# Patient Record
Sex: FEMALE | Employment: FULL TIME | URBAN - METROPOLITAN AREA
[De-identification: names, ages, dates, MRNs, and addresses within clinical notes are randomized per-mention and may not be internally consistent; named-entity substitution may affect disease eponyms.]

---

## 2017-03-02 ENCOUNTER — DOCUMENTATION ONLY (OUTPATIENT)
Dept: NEUROLOGY | Age: 52
End: 2017-03-02

## 2017-03-16 ENCOUNTER — OFFICE VISIT (OUTPATIENT)
Dept: NEUROLOGY | Age: 52
End: 2017-03-16

## 2017-03-16 VITALS
BODY MASS INDEX: 23.53 KG/M2 | TEMPERATURE: 97.8 F | SYSTOLIC BLOOD PRESSURE: 118 MMHG | WEIGHT: 141.2 LBS | OXYGEN SATURATION: 98 % | DIASTOLIC BLOOD PRESSURE: 70 MMHG | HEIGHT: 65 IN | RESPIRATION RATE: 14 BRPM | HEART RATE: 65 BPM

## 2017-03-16 DIAGNOSIS — G44.329 CHRONIC POST-TRAUMATIC HEADACHE, NOT INTRACTABLE: ICD-10-CM

## 2017-03-16 DIAGNOSIS — G40.209 PARTIAL SYMPTOMATIC EPILEPSY WITH COMPLEX PARTIAL SEIZURES, NOT INTRACTABLE, WITHOUT STATUS EPILEPTICUS (HCC): Primary | ICD-10-CM

## 2017-03-16 RX ORDER — SUMATRIPTAN 100 MG/1
100 TABLET, FILM COATED ORAL
Qty: 9 TAB | Refills: 2 | Status: SHIPPED | OUTPATIENT
Start: 2017-03-16 | End: 2017-05-23 | Stop reason: DRUGHIGH

## 2017-03-16 NOTE — MR AVS SNAPSHOT
Visit Information Date & Time Provider Department Dept. Phone Encounter #  
 3/16/2017  3:40 PM Luis Ramirez, [address] [phone] [id number] Follow-up Instructions Return in about 2 months (around 5/16/2017). Follow-up and Disposition History Your Appointments 5/23/2017  3:40 PM  
Follow Up with Lusi Ramirez MD  
[address] [place] [address]-[facility] Appt Note: 2mon f/u  
 [address] [id number] [phone]  
  
   
 [facility] [address] Upcoming Health Maintenance Date Due Hepatitis C Screening 1965 DTaP/Tdap/Td series (1 - Tdap) 2/17/1986 PAP AKA CERVICAL CYTOLOGY 2/17/1986 BREAST CANCER SCRN MAMMOGRAM 2/17/2015 FOBT Q 1 YEAR AGE 50-75 2/17/2015 INFLUENZA AGE 9 TO ADULT 8/1/2016 Allergies as of 3/16/2017  Review Complete On: 3/16/2017 By: Narayan Peraza LPN No Known Allergies Current Immunizations  Never Reviewed No immunizations on file. Not reviewed this visit You Were Diagnosed With   
  
 Codes Comments Partial symptomatic epilepsy with complex partial seizures, not intractable, without status epilepticus ([facility]    -  Primary ICD-10-CM: Z15.740 ICD-9-CM: 345.40 Chronic post-traumatic headache, not intractable     ICD-10-CM: V02.134 ICD-9-CM: 339.22 Vitals BP Pulse Temp Resp Height(growth percentile) Weight(growth percentile) 118/70 (BP 1 Location: Right arm, BP Patient Position: Sitting) 65 97.8 °F (36.6 °C) (Oral) 14 5' 5\" (1.651 m) 141 lb 3.2 oz (64 kg) LMP SpO2 BMI OB Status Smoking Status 03/13/2017 (Exact Date) 98% 23.5 kg/m2 Having regular periods Never Smoker BMI and BSA Data Body Mass Index Body Surface Area  
 23.5 kg/m 2 1.71 m 2 Preferred Pharmacy Pharmacy Name Phone  [id number] - [place], [address] [facility] [phone] Your Updated Medication List  
  
   
This list is accurate as of: 3/16/17  4:24 PM.  Always use your most recent med list.  
  
  
  
  
 divalproex  mg tablet Commonly known as:  DEPAKOTE Take 1 Tab by mouth three (3) times daily. Indications: EPILEPSY, MIGRAINE PREVENTION, SIMPLE-PARTIAL EPILEPSY  
  
 lamoTRIgine 25 mg tablet Commonly known as: LaMICtal  
Take 2 Tabs by mouth two (2) times a day. Indications: COMPLEX-PARTIAL EPILEPSY  
  
 SUMAtriptan 100 mg tablet Commonly known as:  IMITREX Take 1 Tab by mouth once as needed for Migraine for up to 1 dose. May repeat dose in 2 hours. Treat only 2 migraines per week Prescriptions Sent to Pharmacy Refills SUMAtriptan (IMITREX) 100 mg tablet 2 Sig: Take 1 Tab by mouth once as needed for Migraine for up to 1 dose. May repeat dose in 2 hours. Treat only 2 migraines per week Class: Normal  
 Pharmacy: [address] Ph #: [phone] Route: Oral  
  
Follow-up Instructions Return in about 2 months (around 5/16/2017). Introducing \[facility] [place] [facility]\"" & HEALTH SERVICES! Dear Lizet Perfect: 
Thank you for requesting a Snapstream account. Our records indicate that you already have an active Snapstream account. You can access your account anytime at [email or web address] Did you know that you can access your hospital and ER discharge instructions at any time in Snapstream? You can also review all of your test results from your hospital stay or ER visit. Additional Information If you have questions, please visit the Frequently Asked Questions section of the Snapstream website at [email or web address] Remember, Snapstream is NOT to be used for urgent needs. For medical emergencies, dial 911. Now available from your iPhone and Android! Please provide this summary of care documentation to your next provider. Your primary care clinician is listed as PROVIDER UNKNOWN. If you have any questions after today's visit, please call [phone].

## 2017-03-16 NOTE — PROGRESS NOTES
Re:  Darby Memos up visit     3/16/2017 4:06 PM          SSN: [id number]    Subjective:   Kong Cox returns for follow up of her post traumatic seizures and headaches. She had no seizures since June 2016. She's taking only the Depakote since the insurance didn't approve the Lamictal.      She's only getting headaches about every other day. They are a level 10/10 when she gets them. She gets nausea sometimes. Rare vomiting. In terms of her cognition, she feels much better, as if she's just woken up. Medications:    Current Outpatient Prescriptions   Medication Sig Dispense Refill    divalproex DR (DEPAKOTE) 500 mg tablet Take 1 Tab by mouth three (3) times daily. Indications: EPILEPSY, MIGRAINE PREVENTION, SIMPLE-PARTIAL EPILEPSY 90 Tab 5    lamoTRIgine (LAMICTAL) 25 mg tablet Take 2 Tabs by mouth two (2) times a day.  Indications: COMPLEX-PARTIAL EPILEPSY 60 Tab 5       Vital signs:    Visit Vitals    /70 (BP 1 Location: Right arm, BP Patient Position: Sitting)    Pulse 65    Temp 97.8 °F (36.6 °C) (Oral)    Resp 14    Ht 5' 5\" (1.651 m)    Wt 64 kg (141 lb 3.2 oz)    LMP 03/13/2017 (Exact Date)    SpO2 98%    BMI 23.5 kg/m2       Review of Systems:   As above otherwise 11 point review of systems negative including;   Constitutional no fever or chills  Skin denies rash or itching  HEENT  Denies hearing lose  Eyes denies diplopia vision lose  Respiratory denies sortness of breath  Cardiovascular denies chest pain, dyspnea on exertion  Gastrointestinal denies nausea, vomiting, diarrhea, constipation  Genitourinary denies incontinence  Musculoskeletal denies joint pain or swelling  Endocrine denies weight change  Hematology denies easy bruising or bleeding   Neurological as above in HPI      Patient Active Problem List   Diagnosis Code    Post-traumatic headache, not intractable G44.309    Seizures ([facility]) R56.9    Seizure ([facility] R56.9    Epilepsy ([facility] G40.909         Objective: The patient is awake, alert, and oriented x 4. Fund of knowledge is adequate. Speech is fluent and memory is intact. Cranial Nerves: II - Visual fields are full to confrontation. III, IV, VI - Extraocular movements are intact. There is no nystagmus. V - Facial sensation is intact to pinprick. VII - Face is symmetrical.  VIII - Hearing is present. IX, X, XII - Palate is symmetrical.   XI - Shoulder shrugging and head turning intact  Motor: The patient moves all 4 limbs fairly well and symmetrically. Tone is normal. Reflexes are 2+ and symmetrical. Plantars are down going. Gait is normal.      CBC:   Lab Results   Component Value Date/Time    WBC 4.3 12/21/2015 02:03 PM    RBC 4.42 12/21/2015 02:03 PM    HGB 13.1 12/21/2015 02:03 PM    HCT 38.3 12/21/2015 02:03 PM    PLATELET 559 12/22/6586 02:03 PM     BMP:   Lab Results   Component Value Date/Time    Glucose 110 06/08/2015 10:57 AM    Sodium 140 06/08/2015 10:57 AM    Potassium 4.1 06/08/2015 10:57 AM    Chloride 108 06/08/2015 10:57 AM    CO2 24 06/08/2015 10:57 AM    BUN 13 06/08/2015 10:57 AM    Creatinine 0.95 06/08/2015 10:57 AM    Calcium 8.3 06/08/2015 10:57 AM     CMP:   Lab Results   Component Value Date/Time    Glucose 110 06/08/2015 10:57 AM    Sodium 140 06/08/2015 10:57 AM    Potassium 4.1 06/08/2015 10:57 AM    Chloride 108 06/08/2015 10:57 AM    CO2 24 06/08/2015 10:57 AM    BUN 13 06/08/2015 10:57 AM    Creatinine 0.95 06/08/2015 10:57 AM    Calcium 8.3 06/08/2015 10:57 AM    Anion gap 8 06/08/2015 10:57 AM    BUN/Creatinine ratio 14 06/08/2015 10:57 AM    Alk.  phosphatase 40 12/21/2015 02:03 PM    Protein, total 7.1 12/21/2015 02:03 PM    Albumin 3.6 12/21/2015 02:03 PM    Globulin 3.5 12/21/2015 02:03 PM    A-G Ratio 1.0 12/21/2015 02:03 PM     Coagulation: No results found for: PTP, INR, APTT, PTTT  Cardiac markers: No results found for: CPK, CKND1, JUHI    Assessment:  Post traumatic headaches and epilepsy, both seem to be better controlled at this time. Still with significant breakthrough headaches. Plan: Will continue her medication at it's current dosing. Will add some Imitrex 100 mg PO to be taken at onset of headaches. Return here in 2 months. Sincerely,        Bc Davila.  Claudine Estrella M.D.

## 2017-03-30 ENCOUNTER — DOCUMENTATION ONLY (OUTPATIENT)
Dept: NEUROLOGY | Age: 52
End: 2017-03-30

## 2017-04-17 ENCOUNTER — TELEPHONE (OUTPATIENT)
Dept: NEUROLOGY | Age: 52
End: 2017-04-17

## 2017-04-17 NOTE — TELEPHONE ENCOUNTER
Patient into office and stated that she had a seizure on Sunday and she did not know what to do. Patient also stated the she vomitted after she awakened from the seizure. Asked patient did she seek medical attention at an emergency room or urgent care. Patient stated that she did not. Patient was advise to seek emergency medical attention, contact her PCP and we will also inform Dr. Dahlia Leung of recent event and advise her of his recommendations. Patient stated that she did not have a PCP and wanted to know when her next appt with Dr. Dahlia Leung was. Patient was given next appt date 5/23 @3:40; she then asked for something soon like today. Patient was informed that Dr. Dahlia Leung did not have any available openings today due to him being on call at the hospital this afternoon. However, will consult with Dr. Dahlia Leung on the recent events and follow up with her after which.

## 2017-04-18 NOTE — TELEPHONE ENCOUNTER
Ankur Archuleta MD                  No urgency to be seen after a single brake through seizure.  Will see at routine follow up and we'll adjust medications at that time. [facility] hasn't had a breakthrough seizure for a while.  No big worry (Routing comment)

## 2017-04-19 NOTE — TELEPHONE ENCOUNTER
Spoke with patient and informed her of Dr. Desirae Tirado instructions. Patient verbalized understanding.

## 2017-05-12 ENCOUNTER — DOCUMENTATION ONLY (OUTPATIENT)
Dept: NEUROLOGY | Age: 52
End: 2017-05-12

## 2017-05-12 NOTE — PROGRESS NOTES
Documentation of eligibility of Home Delivery of Divalproex received from [employer]  Scanned to cc and given to nurse for MD Review

## 2017-05-23 ENCOUNTER — OFFICE VISIT (OUTPATIENT)
Dept: NEUROLOGY | Age: 52
End: 2017-05-23

## 2017-05-23 VITALS
HEIGHT: 65 IN | HEART RATE: 61 BPM | OXYGEN SATURATION: 99 % | WEIGHT: 139.2 LBS | RESPIRATION RATE: 14 BRPM | BODY MASS INDEX: 23.19 KG/M2 | TEMPERATURE: 98 F | SYSTOLIC BLOOD PRESSURE: 138 MMHG | DIASTOLIC BLOOD PRESSURE: 70 MMHG

## 2017-05-23 DIAGNOSIS — G44.329 CHRONIC POST-TRAUMATIC HEADACHE, NOT INTRACTABLE: ICD-10-CM

## 2017-05-23 DIAGNOSIS — Z51.81 THERAPEUTIC DRUG MONITORING: Primary | ICD-10-CM

## 2017-05-23 DIAGNOSIS — G40.209 PARTIAL SYMPTOMATIC EPILEPSY WITH COMPLEX PARTIAL SEIZURES, NOT INTRACTABLE, WITHOUT STATUS EPILEPTICUS (HCC): ICD-10-CM

## 2017-05-23 RX ORDER — SUMATRIPTAN 50 MG/1
50 TABLET, FILM COATED ORAL
Qty: 9 TAB | Refills: 5 | Status: SHIPPED | OUTPATIENT
Start: 2017-05-23 | End: 2017-11-10 | Stop reason: SDUPTHER

## 2017-05-23 RX ORDER — DIVALPROEX SODIUM 500 MG/1
1000 TABLET, DELAYED RELEASE ORAL 2 TIMES DAILY
Qty: 120 TAB | Refills: 6 | Status: SHIPPED | OUTPATIENT
Start: 2017-05-23 | End: 2017-11-10 | Stop reason: SDUPTHER

## 2017-05-23 NOTE — LETTER
NOTIFICATION OF RETURN TO WORK / SCHOOL 
 
5/23/2017 3:29 PM 
 
Ms. Dorie Mejia 
[address] [address] [place] To Whom It May Concern: 
 
Dorie Mejia is under the care of [address] from 5/23/2017. She will be able to return to work/school on 5/23/2017 
 with no unrestricted heights, no operating heavy machinery, no driving. If there are questions or concerns please have the patient contact our office.  
 
Sincerely, 
 
 
Jose Manuel Pickett MD

## 2017-05-23 NOTE — PROGRESS NOTES
Re:  Pawan Villa up visit     5/23/2017 3:15 PM          SSN: [id number]    Subjective:   [facility] returns for follow up of her post traumatic seizures and headaches. She had a recent seizures, about 4 weeks ago. She's been compliant with her medication. She's taking only the Depakote since the insurance didn't approve the Lamictal.      She's used the Imitrex for the headaches, being very effective for the migraine. The patient is concerned about taking it since she's worried about the Imitrex causing seizures. I offered her a decrease in the dose, but did reassure her Imitrex doesn't cause seizure. .          Medications:    Current Outpatient Prescriptions   Medication Sig Dispense Refill    divalproex DR (DEPAKOTE) 500 mg tablet Take 1 Tab by mouth three (3) times daily. Indications: EPILEPSY, MIGRAINE PREVENTION, SIMPLE-PARTIAL EPILEPSY 90 Tab 5    SUMAtriptan (IMITREX) 100 mg tablet Take 1 Tab by mouth once as needed for Migraine for up to 1 dose. May repeat dose in 2 hours. Treat only 2 migraines per week 9 Tab 2    lamoTRIgine (LAMICTAL) 25 mg tablet Take 2 Tabs by mouth two (2) times a day.  Indications: COMPLEX-PARTIAL EPILEPSY 60 Tab 5       Vital signs:    Visit Vitals    /70 (BP 1 Location: Left arm, BP Patient Position: Sitting)    Pulse 61    Temp 98 °F (36.7 °C) (Oral)    Resp 14    Ht 5' 5\" (1.651 m)    Wt 63.1 kg (139 lb 3.2 oz)    LMP 05/22/2017 (Exact Date)    SpO2 99%    BMI 23.16 kg/m2       Review of Systems:   As above otherwise 11 point review of systems negative including;   Constitutional no fever or chills  Skin denies rash or itching  HEENT  Denies hearing lose  Eyes denies diplopia vision lose  Respiratory denies sortness of breath  Cardiovascular denies chest pain, dyspnea on exertion  Gastrointestinal denies nausea, vomiting, diarrhea, constipation  Genitourinary denies incontinence  Musculoskeletal denies joint pain or swelling  Endocrine denies weight change  Hematology denies easy bruising or bleeding   Neurological as above in HPI      Patient Active Problem List   Diagnosis Code    Post-traumatic headache, not intractable G44.309    Seizures ([facility] R56.9    [facility] R56.9    Epilepsy ([facility] G40.909         Objective: The patient is awake, alert, and oriented x 4. Fund of knowledge is adequate. Speech is fluent and memory is intact. Cranial Nerves: II - Visual fields are full to confrontation. III, IV, VI - Extraocular movements are intact. There is no nystagmus. V - Facial sensation is intact to pinprick. VII - Face is symmetrical.  VIII - Hearing is present. IX, X, XII - Palate is symmetrical.   XI - Shoulder shrugging and head turning intact  Motor: The patient moves all 4 limbs fairly well and symmetrically. Tone is normal. Reflexes are 2+ and symmetrical. Plantars are down going. Gait is abnormal, she limps off of the left leg from her trauma back 4 weeks ago. CBC:   Lab Results   Component Value Date/Time    WBC 4.3 12/21/2015 02:03 PM    RBC 4.42 12/21/2015 02:03 PM    HGB 13.1 12/21/2015 02:03 PM    HCT 38.3 12/21/2015 02:03 PM    PLATELET 017 70/98/7633 02:03 PM     BMP:   Lab Results   Component Value Date/Time    Glucose 110 06/08/2015 10:57 AM    Sodium 140 06/08/2015 10:57 AM    Potassium 4.1 06/08/2015 10:57 AM    Chloride 108 06/08/2015 10:57 AM    CO2 24 06/08/2015 10:57 AM    BUN 13 06/08/2015 10:57 AM    Creatinine 0.95 06/08/2015 10:57 AM    Calcium 8.3 06/08/2015 10:57 AM     CMP:   Lab Results   Component Value Date/Time    Glucose 110 06/08/2015 10:57 AM    Sodium 140 06/08/2015 10:57 AM    Potassium 4.1 06/08/2015 10:57 AM    Chloride 108 06/08/2015 10:57 AM    CO2 24 06/08/2015 10:57 AM    BUN 13 06/08/2015 10:57 AM    Creatinine 0.95 06/08/2015 10:57 AM    Calcium 8.3 06/08/2015 10:57 AM    Anion gap 8 06/08/2015 10:57 AM    BUN/Creatinine ratio 14 06/08/2015 10:57 AM    Alk. phosphatase 40 12/21/2015 02:03 PM    Protein, total 7.1 12/21/2015 02:03 PM    Albumin 3.6 12/21/2015 02:03 PM    Globulin 3.5 12/21/2015 02:03 PM    A-G Ratio 1.0 12/21/2015 02:03 PM     Coagulation: No results found for: PTP, INR, APTT, PTTT  Cardiac markers: No results found for: CPK, CKND1, JUHI    Assessment:  Post traumatic headaches and epilepsy, both seem to be better controlled at this time. Still with significant breakthrough headaches. Also had a recent breakthrough seizure. Also noted to have significant limping off of the left leg from knee pain, now a month post seizure. Plan: Will increase the Depakote to 1000 mg BID and get a level in 2 weeks. Will decrease the Imitrex to 50 mg PO to be taken at onset of headaches. Return here in 2 months. Sincerely,        Domenica Lutz M.D.

## 2017-05-23 NOTE — LETTER
5/23/2017 3:27 PM 
 
Patient:  Mirna Burgess YOB: 1965 Date of Visit: 5/23/2017 Dear No Recipients: Thank you for referring Ms. Mirna Burgess to me for evaluation/treatment. Below are the relevant portions of my assessment and plan of care. Re:  Mery Cost up visit     5/23/2017 3:15 PM 
 
 
 
 
SSN: [id number] Subjective:   Mirna Burgess returns for follow up of her post traumatic seizures and headaches. She had a recent seizures, about 4 weeks ago. She's been compliant with her medication. She's taking only the Depakote since the insurance didn't approve the Lamictal.   
 
She's used the Imitrex for the headaches, being very effective for the migraine. The patient is concerned about taking it since she's worried about the Imitrex causing seizures. I offered her a decrease in the dose, but did reassure her Imitrex doesn't cause seizure. .     
 
 
Medications:   
Current Outpatient Prescriptions Medication Sig Dispense Refill  divalproex DR (DEPAKOTE) 500 mg tablet Take 1 Tab by mouth three (3) times daily. Indications: EPILEPSY, MIGRAINE PREVENTION, SIMPLE-PARTIAL EPILEPSY 90 Tab 5  SUMAtriptan (IMITREX) 100 mg tablet Take 1 Tab by mouth once as needed for Migraine for up to 1 dose. May repeat dose in 2 hours. Treat only 2 migraines per week 9 Tab 2  
 lamoTRIgine (LAMICTAL) 25 mg tablet Take 2 Tabs by mouth two (2) times a day. Indications: COMPLEX-PARTIAL EPILEPSY 60 Tab 5 Vital signs:   
Visit Vitals  /70 (BP 1 Location: Left arm, BP Patient Position: Sitting)  Pulse 61  Temp 98 °F (36.7 °C) (Oral)  Resp 14  
 Ht 5' 5\" (1.651 m)  Wt 63.1 kg (139 lb 3.2 oz)  LMP 05/22/2017 (Exact Date)  SpO2 99%  BMI 23.16 kg/m2 Review of Systems: As above otherwise 11 point review of systems negative including;  
Constitutional no fever or chills Skin denies rash or itching HEENT  Denies hearing lose Eyes denies diplopia vision lose Respiratory denies sortness of breath Cardiovascular denies chest pain, dyspnea on exertion Gastrointestinal denies nausea, vomiting, diarrhea, constipation Genitourinary denies incontinence Musculoskeletal denies joint pain or swelling Endocrine denies weight change Hematology denies easy bruising or bleeding Neurological as above in HPI Patient Active Problem List  
Diagnosis Code  Post-traumatic headache, not intractable G44.309  Seizures ([facility] R56.9  Seizure ([facility] R56.9  Epilepsy ([facility] G40.909 Objective: The patient is awake, alert, and oriented x 4. Fund of knowledge is adequate. Speech is fluent and memory is intact. Cranial Nerves: II  Visual fields are full to confrontation. III, IV, VI  Extraocular movements are intact. There is no nystagmus. V  Facial sensation is intact to pinprick. VII  Face is symmetrical.  VIII - Hearing is present. IX, X, XII  Palate is symmetrical.   XI - Shoulder shrugging and head turning intact Motor: The patient moves all 4 limbs fairly well and symmetrically. Tone is normal. Reflexes are 2+ and symmetrical. Plantars are down going. Gait is abnormal, she limps off of the left leg from her trauma back 4 weeks ago. CBC:  
Lab Results Component Value Date/Time WBC 4.3 12/21/2015 02:03 PM  
 RBC 4.42 12/21/2015 02:03 PM  
 HGB 13.1 12/21/2015 02:03 PM  
 HCT 38.3 12/21/2015 02:03 PM  
 PLATELET 348 21/31/7899 02:03 PM  
 
BMP:  
Lab Results Component Value Date/Time Glucose 110 06/08/2015 10:57 AM  
 Sodium 140 06/08/2015 10:57 AM  
 Potassium 4.1 06/08/2015 10:57 AM  
 Chloride 108 06/08/2015 10:57 AM  
 CO2 24 06/08/2015 10:57 AM  
 BUN 13 06/08/2015 10:57 AM  
 Creatinine 0.95 06/08/2015 10:57 AM  
 Calcium 8.3 06/08/2015 10:57 AM  
 
CMP:  
Lab Results Component Value Date/Time  Glucose 110 06/08/2015 10:57 AM  
 Sodium 140 06/08/2015 10:57 AM  
 Potassium 4.1 06/08/2015 10:57 AM  
 Chloride 108 06/08/2015 10:57 AM  
 CO2 24 06/08/2015 10:57 AM  
 BUN 13 06/08/2015 10:57 AM  
 Creatinine 0.95 06/08/2015 10:57 AM  
 Calcium 8.3 06/08/2015 10:57 AM  
 Anion gap 8 06/08/2015 10:57 AM  
 BUN/Creatinine ratio 14 06/08/2015 10:57 AM  
 Alk. phosphatase 40 12/21/2015 02:03 PM  
 Protein, total 7.1 12/21/2015 02:03 PM  
 Albumin 3.6 12/21/2015 02:03 PM  
 Globulin 3.5 12/21/2015 02:03 PM  
 A-G Ratio 1.0 12/21/2015 02:03 PM  
 
Coagulation: No results found for: PTP, INR, APTT, PTTT Cardiac markers: No results found for: CPK, CKND1, JUHI Assessment:  Post traumatic headaches and epilepsy, both seem to be better controlled at this time. Still with significant breakthrough headaches. Also had a recent breakthrough seizure. Also noted to have significant limping off of the left leg from knee pain, now a month post seizure. Plan: Will increase the Depakote to 1000 mg BID and get a level in 2 weeks. Will decrease the Imitrex to 50 mg PO to be taken at onset of headaches. Return here in 2 months. Sincerely, 
 
 
 
Matt Cárdenas. Duane Dejesus M.D. If you have questions, please do not hesitate to call me. I look forward to following Ms. Ariel Tucker along with you.  
 
 
 
Sincerely, 
 
 
Lucy Rider MD

## 2017-05-23 NOTE — COMMUNICATION BODY
Re:  Luis Cadena up visit     5/23/2017 3:15 PM          SSN: [id number]    Subjective:   Melford Nissen returns for follow up of her post traumatic seizures and headaches. She had a recent seizures, about 4 weeks ago. She's been compliant with her medication. She's taking only the Depakote since the insurance didn't approve the Lamictal.      She's used the Imitrex for the headaches, being very effective for the migraine. The patient is concerned about taking it since she's worried about the Imitrex causing seizures. I offered her a decrease in the dose, but did reassure her Imitrex doesn't cause seizure. .          Medications:    Current Outpatient Prescriptions   Medication Sig Dispense Refill    divalproex DR (DEPAKOTE) 500 mg tablet Take 1 Tab by mouth three (3) times daily. Indications: EPILEPSY, MIGRAINE PREVENTION, SIMPLE-PARTIAL EPILEPSY 90 Tab 5    SUMAtriptan (IMITREX) 100 mg tablet Take 1 Tab by mouth once as needed for Migraine for up to 1 dose. May repeat dose in 2 hours. Treat only 2 migraines per week 9 Tab 2    lamoTRIgine (LAMICTAL) 25 mg tablet Take 2 Tabs by mouth two (2) times a day.  Indications: COMPLEX-PARTIAL EPILEPSY 60 Tab 5       Vital signs:    Visit Vitals    /70 (BP 1 Location: Left arm, BP Patient Position: Sitting)    Pulse 61    Temp 98 °F (36.7 °C) (Oral)    Resp 14    Ht 5' 5\" (1.651 m)    Wt 63.1 kg (139 lb 3.2 oz)    LMP 05/22/2017 (Exact Date)    SpO2 99%    BMI 23.16 kg/m2       Review of Systems:   As above otherwise 11 point review of systems negative including;   Constitutional no fever or chills  Skin denies rash or itching  HEENT  Denies hearing lose  Eyes denies diplopia vision lose  Respiratory denies sortness of breath  Cardiovascular denies chest pain, dyspnea on exertion  Gastrointestinal denies nausea, vomiting, diarrhea, constipation  Genitourinary denies incontinence  Musculoskeletal denies joint pain or swelling  Endocrine denies weight change  Hematology denies easy bruising or bleeding   Neurological as above in HPI      Patient Active Problem List   Diagnosis Code    Post-traumatic headache, not intractable G44.309    Seizures ([facility] R56.9    [facility] R[facility]    Epilepsy ([facility] G40.909         Objective: The patient is awake, alert, and oriented x 4. Fund of knowledge is adequate. Speech is fluent and memory is intact. Cranial Nerves: II - Visual fields are full to confrontation. III, IV, VI - Extraocular movements are intact. There is no nystagmus. V - Facial sensation is intact to pinprick. VII - Face is symmetrical.  VIII - Hearing is present. IX, X, XII - Palate is symmetrical.   XI - Shoulder shrugging and head turning intact  Motor: The patient moves all 4 limbs fairly well and symmetrically. Tone is normal. Reflexes are 2+ and symmetrical. Plantars are down going. Gait is abnormal, she limps off of the left leg from her trauma back 4 weeks ago. CBC:   Lab Results   Component Value Date/Time    WBC 4.3 12/21/2015 02:03 PM    RBC 4.42 12/21/2015 02:03 PM    HGB 13.1 12/21/2015 02:03 PM    HCT 38.3 12/21/2015 02:03 PM    PLATELET 602 64/51/9908 02:03 PM     BMP:   Lab Results   Component Value Date/Time    Glucose 110 06/08/2015 10:57 AM    Sodium 140 06/08/2015 10:57 AM    Potassium 4.1 06/08/2015 10:57 AM    Chloride 108 06/08/2015 10:57 AM    CO2 24 06/08/2015 10:57 AM    BUN 13 06/08/2015 10:57 AM    Creatinine 0.95 06/08/2015 10:57 AM    Calcium 8.3 06/08/2015 10:57 AM     CMP:   Lab Results   Component Value Date/Time    Glucose 110 06/08/2015 10:57 AM    Sodium 140 06/08/2015 10:57 AM    Potassium 4.1 06/08/2015 10:57 AM    Chloride 108 06/08/2015 10:57 AM    CO2 24 06/08/2015 10:57 AM    BUN 13 06/08/2015 10:57 AM    Creatinine 0.95 06/08/2015 10:57 AM    Calcium 8.3 06/08/2015 10:57 AM    Anion gap 8 06/08/2015 10:57 AM    BUN/Creatinine ratio 14 06/08/2015 10:57 AM    Alk. phosphatase 40 12/21/2015 02:03 PM    Protein, total 7.1 12/21/2015 02:03 PM    Albumin 3.6 12/21/2015 02:03 PM    Globulin 3.5 12/21/2015 02:03 PM    A-G Ratio 1.0 12/21/2015 02:03 PM     Coagulation: No results found for: PTP, INR, APTT, PTTT  Cardiac markers: No results found for: CPK, CKND1, JUHI    Assessment:  Post traumatic headaches and epilepsy, both seem to be better controlled at this time. Still with significant breakthrough headaches. Also had a recent breakthrough seizure. Also noted to have significant limping off of the left leg from knee pain, now a month post seizure. Plan: Will increase the Depakote to 1000 mg BID and get a level in 2 weeks. Will decrease the Imitrex to 50 mg PO to be taken at onset of headaches. Return here in 2 months. Sincerely,        Nehemiah Lawson.  [place] M.D.

## 2017-05-23 NOTE — MR AVS SNAPSHOT
Visit Information Date & Time Provider Department Dept. Phone Encounter #  
 5/23/2017  2:20 PM Brian Boyce MD [facility] [phone] Follow-up Instructions Return in about 3 months (around 8/23/2017). Your Appointments 8/22/2017  2:40 PM  
Follow Up with Brian Boyce MD  
[facility] [facility] Appt Note: 2mon f/u; Labs [address] [place] [id number] [phone]  
  
   
 [place] [id number] Upcoming Health Maintenance Date Due Hepatitis C Screening 1965 DTaP/Tdap/Td series (1 - Tdap) 2/17/1986 PAP AKA CERVICAL CYTOLOGY 2/17/1986 BREAST CANCER SCRN MAMMOGRAM 2/17/2015 FOBT Q 1 YEAR AGE 50-75 2/17/2015 INFLUENZA AGE 9 TO ADULT 8/1/2017 Allergies as of 5/23/2017  Review Complete On: 5/23/2017 By: Radha Mcdaniel LPN No Known Allergies Current Immunizations  Never Reviewed No immunizations on file. Not reviewed this visit You Were Diagnosed With   
  
 Codes Comments Therapeutic drug monitoring    -  Primary ICD-10-CM: Z51.81 
ICD-9-CM: V58.83 Partial symptomatic epilepsy with complex partial seizures, not intractable, without status epilepticus ([facility]     ICD-10-CM: G17.614 ICD-9-CM: 345.40 Chronic post-traumatic headache, not intractable     ICD-10-CM: W91.222 ICD-9-CM: 339.22 Vitals BP Pulse Temp Resp Height(growth percentile) Weight(growth percentile) 138/70 (BP 1 Location: Left arm, BP Patient Position: Sitting) 61 98 °F (36.7 °C) (Oral) 14 5' 5\" (1.651 m) 139 lb 3.2 oz (63.1 kg) LMP SpO2 BMI OB Status Smoking Status 05/22/2017 (Exact Date) 99% 23.16 kg/m2 Having regular periods Never Smoker Vitals History BMI and BSA Data Body Mass Index Body Surface Area  
 23.16 kg/m 2 1.7 m 2 Preferred Pharmacy Pharmacy Name Phone [facility] [address] [place] [address] [place] [address] [phone] Your Updated Medication List  
  
   
This list is accurate as of: 5/23/17  3:39 PM.  Always use your most recent med list.  
  
  
  
  
 divalproex  mg tablet Commonly known as:  DEPAKOTE Take 2 Tabs by mouth two (2) times a day. Indications: Epilepsy, MIGRAINE PREVENTION, SIMPLE-PARTIAL EPILEPSY  
  
 SUMAtriptan 50 mg tablet Commonly known as:  IMITREX Take 1 Tab by mouth once as needed for Migraine for up to 1 dose. Prescriptions Sent to Pharmacy Refills  
 divalproex DR (DEPAKOTE) 500 mg tablet 6 Sig: Take 2 Tabs by mouth two (2) times a day. Indications: Epilepsy, MIGRAINE PREVENTION, SIMPLE-PARTIAL EPILEPSY Class: Normal  
 Pharmacy: [id number] [place] [address]. Y Ph #: [phone] Route: Oral  
 SUMAtriptan (IMITREX) 50 mg tablet 5 Sig: Take 1 Tab by mouth once as needed for Migraine for up to 1 dose. Class: Normal  
 Pharmacy: [address] [place] [address]. Y Ph #: [phone] Route: Oral  
  
Follow-up Instructions Return in about 3 months (around 8/23/2017). To-Do List   
 06/06/2017 Lab:  [facility] Introducing [facility]! Dear Enid Almanzar: 
Thank you for requesting a [employer] account. Our records indicate that you already have an active [employer] account. You can access your account anytime at [email or web address] Did you know that you can access your hospital and ER discharge instructions at any time in [employer]? You can also review all of your test results from your hospital stay or ER visit. Additional Information If you have questions, please visit the Frequently Asked Questions section of the [employer] website at [email or web address] Remember, [employer] is NOT to be used for urgent needs.  For medical emergencies, dial 911. Now available from your iPhone and Android! Please provide this summary of care documentation to your next provider. Your primary care clinician is listed as PROVIDER UNKNOWN. If you have any questions after today's visit, please call [phone].

## 2017-11-10 ENCOUNTER — OFFICE VISIT (OUTPATIENT)
Dept: NEUROLOGY | Age: 52
End: 2017-11-10

## 2017-11-10 VITALS
WEIGHT: 135.8 LBS | BODY MASS INDEX: 22.63 KG/M2 | SYSTOLIC BLOOD PRESSURE: 130 MMHG | DIASTOLIC BLOOD PRESSURE: 82 MMHG | TEMPERATURE: 97.7 F | HEIGHT: 65 IN

## 2017-11-10 DIAGNOSIS — G40.209 PARTIAL SYMPTOMATIC EPILEPSY WITH COMPLEX PARTIAL SEIZURES, NOT INTRACTABLE, WITHOUT STATUS EPILEPTICUS (HCC): ICD-10-CM

## 2017-11-10 DIAGNOSIS — G44.329 CHRONIC POST-TRAUMATIC HEADACHE, NOT INTRACTABLE: ICD-10-CM

## 2017-11-10 RX ORDER — DIVALPROEX SODIUM 500 MG/1
1000 TABLET, DELAYED RELEASE ORAL 2 TIMES DAILY
Qty: 120 TAB | Refills: 10 | Status: SHIPPED | OUTPATIENT
Start: 2017-11-10 | End: 2018-12-08 | Stop reason: SDUPTHER

## 2017-11-10 RX ORDER — SUMATRIPTAN 50 MG/1
50 TABLET, FILM COATED ORAL
Qty: 9 TAB | Refills: 10 | Status: SHIPPED | OUTPATIENT
Start: 2017-11-10 | End: 2018-12-08 | Stop reason: SDUPTHER

## 2017-11-10 NOTE — PROGRESS NOTES
Re:  Rebecca Douglas up visit     11/10/2017 2:56 PM      SSN: [id number]    Subjective:   Patrick Dai returns for follow up of her post traumatic seizures and headaches. She's had no seizures since last seen back in May. She says the depression has been lifting now, she feels better in general.      She's getting no worse than 2 headaches per week. She's treating them with the Imitrex and this is working wihtout side effects. Medications:    Current Outpatient Prescriptions   Medication Sig Dispense Refill    divalproex DR (DEPAKOTE) 500 mg tablet Take 2 Tabs by mouth two (2) times a day. Indications: Epilepsy, MIGRAINE PREVENTION, SIMPLE-PARTIAL EPILEPSY 120 Tab 6    SUMAtriptan (IMITREX) 50 mg tablet Take 1 Tab by mouth once as needed for Migraine for up to 1 dose. 9 Tab 5       Vital signs:    Visit Vitals    /82    Temp 97.7 °F (36.5 °C)    Ht 5' 5\" (1.651 m)    Wt 61.6 kg (135 lb 12.8 oz)    BMI 22.6 kg/m2       Review of Systems:   As above otherwise 11 point review of systems negative including;   Constitutional no fever or chills  Skin denies rash or itching  HEENT  Denies hearing lose  Eyes denies diplopia vision lose  Respiratory denies sortness of breath  Cardiovascular denies chest pain, dyspnea on exertion  Gastrointestinal denies nausea, vomiting, diarrhea, constipation  Genitourinary denies incontinence  Musculoskeletal denies joint pain or swelling  Endocrine denies weight change  Hematology denies easy bruising or bleeding   Neurological as above in HPI      Patient Active Problem List   Diagnosis Code    Post-traumatic headache, not intractable G44.309    Seizures ([facility] R56.9    Seizure ([facility] R56.9    Epilepsy ([facility] G40.909    Therapeutic drug monitoring Z51.81         Objective: The patient is awake, alert, and oriented x 4. Fund of knowledge is adequate. Speech is fluent and memory is intact.   Cranial Nerves: II - Visual fields are full to confrontation. III, IV, VI - Extraocular movements are intact. There is no nystagmus. V - Facial sensation is intact to pinprick. VII - Face is symmetrical.  VIII - Hearing is present. IX, X, XII - Palate is symmetrical.   XI - Shoulder shrugging and head turning intact  Motor: The patient moves all 4 limbs fairly well and symmetrically. Tone is normal. Reflexes are 2+ and symmetrical. Plantars are down going. Gait is normal at this time. CBC:   Lab Results   Component Value Date/Time    WBC 4.3 12/21/2015 02:03 PM    RBC 4.42 12/21/2015 02:03 PM    HGB 13.1 12/21/2015 02:03 PM    HCT 38.3 12/21/2015 02:03 PM    PLATELET 639 76/25/3756 02:03 PM     BMP:   Lab Results   Component Value Date/Time    Glucose 110 06/08/2015 10:57 AM    Sodium 140 06/08/2015 10:57 AM    Potassium 4.1 06/08/2015 10:57 AM    Chloride 108 06/08/2015 10:57 AM    CO2 24 06/08/2015 10:57 AM    BUN 13 06/08/2015 10:57 AM    Creatinine 0.95 06/08/2015 10:57 AM    Calcium 8.3 06/08/2015 10:57 AM     CMP:   Lab Results   Component Value Date/Time    Glucose 110 06/08/2015 10:57 AM    Sodium 140 06/08/2015 10:57 AM    Potassium 4.1 06/08/2015 10:57 AM    Chloride 108 06/08/2015 10:57 AM    CO2 24 06/08/2015 10:57 AM    BUN 13 06/08/2015 10:57 AM    Creatinine 0.95 06/08/2015 10:57 AM    Calcium 8.3 06/08/2015 10:57 AM    Anion gap 8 06/08/2015 10:57 AM    BUN/Creatinine ratio 14 06/08/2015 10:57 AM    Alk.  phosphatase 40 12/21/2015 02:03 PM    Protein, total 7.1 12/21/2015 02:03 PM    Albumin 3.6 12/21/2015 02:03 PM    Globulin 3.5 12/21/2015 02:03 PM    A-G Ratio 1.0 12/21/2015 02:03 PM     Coagulation: No results found for: PTP, INR, APTT, PTTT  Cardiac markers: No results found for: CPK, CKND1, JUHI    6/22/2016 10:44 PM - [place], Lab In [id number]   Component Results   Component Value Flag Ref Range Units Status   Valproic acid 20 (L) 50 - 100 ug/ml Final         Assessment:  Post traumatic headaches and epilepsy, both seem to be well controlled at this time. Plan: Will contniue the Depakote to 1000 mg BID. Will continue the Imitrex to 50 mg PO to be taken at onset of headaches. Return here in 6 months. Sincerely,        Jesús Quintana.  Devi Epstein M.D.

## 2017-12-08 ENCOUNTER — TELEPHONE (OUTPATIENT)
Dept: NEUROLOGY | Age: 52
End: 2017-12-08

## 2017-12-08 NOTE — TELEPHONE ENCOUNTER
Patient states she was referred to a psychologist for depression and was denied. Patient would like a referral to a different provider covered by insurance.  Please advise

## 2017-12-12 NOTE — TELEPHONE ENCOUNTER
Spoke with Ms. [place] Whit. I do not see a psych referral in her chart. She states that it was discussed during one of her visits. She states, she needs the name of the psychologist that Dr. Sharri Rosen recommends so that workers' comp will pay for the consult. Informed the patient that I would ask Dr. Sharri Rosen about it and get back to her. Patient verbalized understanding.

## 2017-12-13 NOTE — TELEPHONE ENCOUNTER
Vivian Canales MD   You 19 hours ago (1:05 PM)                 I never recommended any particular psychologist and would have no idea who worker's comp would pay for.  I personally don't think her depression is injury related and will not write a script (Routing comment)               Left voicemail for patient to return call.

## 2019-08-26 DIAGNOSIS — G40.209 PARTIAL SYMPTOMATIC EPILEPSY WITH COMPLEX PARTIAL SEIZURES, NOT INTRACTABLE, WITHOUT STATUS EPILEPTICUS (HCC): ICD-10-CM

## 2019-08-26 DIAGNOSIS — G44.329 CHRONIC POST-TRAUMATIC HEADACHE, NOT INTRACTABLE: ICD-10-CM

## 2019-08-26 RX ORDER — DIVALPROEX SODIUM 500 MG/1
TABLET, DELAYED RELEASE ORAL
Qty: 120 TAB | Refills: 10 | Status: SHIPPED | OUTPATIENT
Start: 2019-08-26